# Patient Record
Sex: MALE | Race: OTHER | NOT HISPANIC OR LATINO | URBAN - METROPOLITAN AREA
[De-identification: names, ages, dates, MRNs, and addresses within clinical notes are randomized per-mention and may not be internally consistent; named-entity substitution may affect disease eponyms.]

---

## 2018-04-24 ENCOUNTER — OUTPATIENT (OUTPATIENT)
Dept: OUTPATIENT SERVICES | Facility: HOSPITAL | Age: 48
LOS: 1 days | Discharge: HOME | End: 2018-04-24

## 2018-04-24 DIAGNOSIS — Z00.01 ENCOUNTER FOR GENERAL ADULT MEDICAL EXAMINATION WITH ABNORMAL FINDINGS: ICD-10-CM

## 2019-07-26 ENCOUNTER — EMERGENCY (EMERGENCY)
Facility: HOSPITAL | Age: 49
LOS: 0 days | Discharge: HOME | End: 2019-07-26
Attending: EMERGENCY MEDICINE | Admitting: EMERGENCY MEDICINE
Payer: COMMERCIAL

## 2019-07-26 VITALS
OXYGEN SATURATION: 98 % | HEIGHT: 65 IN | DIASTOLIC BLOOD PRESSURE: 75 MMHG | HEART RATE: 78 BPM | RESPIRATION RATE: 20 BRPM | TEMPERATURE: 98 F | SYSTOLIC BLOOD PRESSURE: 155 MMHG | WEIGHT: 179.9 LBS

## 2019-07-26 DIAGNOSIS — N50.811 RIGHT TESTICULAR PAIN: ICD-10-CM

## 2019-07-26 DIAGNOSIS — R10.9 UNSPECIFIED ABDOMINAL PAIN: ICD-10-CM

## 2019-07-26 DIAGNOSIS — F17.200 NICOTINE DEPENDENCE, UNSPECIFIED, UNCOMPLICATED: ICD-10-CM

## 2019-07-26 DIAGNOSIS — K40.90 UNILATERAL INGUINAL HERNIA, WITHOUT OBSTRUCTION OR GANGRENE, NOT SPECIFIED AS RECURRENT: ICD-10-CM

## 2019-07-26 DIAGNOSIS — R05 COUGH: ICD-10-CM

## 2019-07-26 PROCEDURE — 99053 MED SERV 10PM-8AM 24 HR FAC: CPT

## 2019-07-26 PROCEDURE — 99284 EMERGENCY DEPT VISIT MOD MDM: CPT

## 2019-07-26 PROCEDURE — 76870 US EXAM SCROTUM: CPT | Mod: 26

## 2019-07-26 NOTE — ED PROVIDER NOTE - ATTENDING CONTRIBUTION TO CARE
49yoM previously healthy presents with R groin pain x 2 days, nonradiating, with coughing and standing. States he has noticed a bulge in the R groin area as well when he coughs. He has been coughing for 3-4 days now. Denies abdominal pain, fever, leg pain or swelling, dys/hematuria, vomiting, diarrhea, penile discharge, or any other symptoms. On exam, afebrile, hemodynamically stable, saturating well, NAD, well appearing, head NCAT, EOMI grossly, anicteric, MMM, no JVD, RRR, nml S1/S2, no m/r/g, lungs CTAB, no w/r/r, abd soft, NT, ND, nml BS, no rebound or guarding, AAO, CN's 3-12 grossly intact, GONZALEZ spontaneously, no leg cyanosis or edema, skin warm, well perfused, no rashes or hives. Palpable easily reducible R inguinal hernia with no overlying erythema or tenderness.  (with below resident as chaperone): no testicular pain/swelling/erythema. Nml lie. Character low suspicion for torsion or epididymitis and no e/o this on US. Character and exam of high suspicion for hernia. No e/o incarceration and no vomiting or signs of obstruction. Patient is well appearing, NAD, afebrile, hemodynamically stable. No pain and declines analgesia. Discharged with instructions in further symptomatic care, return precautions, and need for gen surg f/u.

## 2019-07-26 NOTE — ED PROVIDER NOTE - NS ED ROS FT
Eyes:  No visual changes  ENMT:  No sore throat  Cardiac:  No chest pain, SOB or edema.   Respiratory:  +cough   GI:  No nausea, vomiting, diarrhea or abdominal pain.  :  No dysuria, frequency or burning.  MS:  No back pain   Neuro:  No headache   Skin:  No skin rash.   Endocrine: No history of thyroid disease or diabetes.

## 2019-07-26 NOTE — ED PROVIDER NOTE - OBJECTIVE STATEMENT
49y M presenting for RLQ pain x2days. Acute onset, radiation to R testicle. Pt has not appreciated any bulges. No trauma, no fall. Pt has been having congestion and has been coughing all week. Pain worse with cough. No penile discharge, dysuria, or any urinary symptoms. No nausea, vomiting, fevers, or chills. Normal bowel movements and appetite. No hx of hernia.

## 2019-07-26 NOTE — ED PROVIDER NOTE - MDM ORDERS SUBMITTED SELECTION
Informed pt, Dr. Brown is recommending pt go to ER.    Patient verbalizes understanding and is agreement with treatment plan, no further questions at this time.     Imaging Studies

## 2019-07-26 NOTE — ED PROVIDER NOTE - NSFOLLOWUPCLINICS_GEN_ALL_ED_FT
[address] [facility]  Surgery  [address] [place] [address]  Phone: [phone]  Fax:   Follow Up Time:

## 2019-07-26 NOTE — ED PROVIDER NOTE - PHYSICAL EXAMINATION
CONSTITUTIONAL: Well-developed; well-nourished; in no acute distress.   SKIN: warm, dry  HEAD: Normocephalic; atraumatic.  EYES: no conjunctival injection.    ENT: No nasal discharge; airway clear.  CARD: Regular rate and rhythm.   RESP: No wheezes, rales or rhonchi.  ABD: soft. Bulge appreciated in R inguinal region. Tender R testicle.   EXT: Normal ROM.  No clubbing, cyanosis or edema.   NEURO: Alert, oriented, grossly unremarkable  PSYCH: Cooperative, appropriate.

## 2019-07-26 NOTE — ED ADULT NURSE NOTE - NSIMPLEMENTINTERV_GEN_ALL_ED
Implemented All Universal Safety Interventions:  [place] to call system. Call bell, personal items and telephone within reach. Instruct patient to call for assistance. Room bathroom lighting operational. Non-slip footwear when patient is off stretcher. Physically safe environment: no spills, clutter or unnecessary equipment. Stretcher in lowest position, wheels locked, appropriate side rails in place.

## 2021-11-10 ENCOUNTER — OUTPATIENT (OUTPATIENT)
Dept: OUTPATIENT SERVICES | Facility: HOSPITAL | Age: 51
LOS: 1 days | Discharge: HOME | End: 2021-11-10
Payer: MEDICAID

## 2021-11-10 DIAGNOSIS — R10.12 LEFT UPPER QUADRANT PAIN: ICD-10-CM

## 2021-11-10 PROCEDURE — 71046 X-RAY EXAM CHEST 2 VIEWS: CPT | Mod: 26

## 2021-11-10 PROCEDURE — 76700 US EXAM ABDOM COMPLETE: CPT | Mod: 26

## 2024-01-11 ENCOUNTER — EMERGENCY (EMERGENCY)
Facility: HOSPITAL | Age: 54
LOS: 0 days | Discharge: HOME | End: 2024-01-11
Attending: STUDENT IN AN ORGANIZED HEALTH CARE EDUCATION/TRAINING PROGRAM
Payer: COMMERCIAL

## 2024-01-11 VITALS
OXYGEN SATURATION: 99 % | DIASTOLIC BLOOD PRESSURE: 106 MMHG | HEART RATE: 89 BPM | WEIGHT: 179.9 LBS | SYSTOLIC BLOOD PRESSURE: 219 MMHG | RESPIRATION RATE: 18 BRPM | TEMPERATURE: 98 F

## 2024-01-11 VITALS — SYSTOLIC BLOOD PRESSURE: 180 MMHG | DIASTOLIC BLOOD PRESSURE: 94 MMHG | HEART RATE: 102 BPM

## 2024-01-11 DIAGNOSIS — I10 ESSENTIAL (PRIMARY) HYPERTENSION: ICD-10-CM

## 2024-01-11 DIAGNOSIS — R51.9 HEADACHE, UNSPECIFIED: ICD-10-CM

## 2024-01-11 DIAGNOSIS — F17.200 NICOTINE DEPENDENCE, UNSPECIFIED, UNCOMPLICATED: ICD-10-CM

## 2024-01-11 PROCEDURE — 99284 EMERGENCY DEPT VISIT MOD MDM: CPT | Mod: 25

## 2024-01-11 PROCEDURE — G1004: CPT

## 2024-01-11 PROCEDURE — 70450 CT HEAD/BRAIN W/O DYE: CPT | Mod: MF

## 2024-01-11 PROCEDURE — 70450 CT HEAD/BRAIN W/O DYE: CPT | Mod: 26,MF

## 2024-01-11 PROCEDURE — 99284 EMERGENCY DEPT VISIT MOD MDM: CPT

## 2024-01-11 RX ORDER — AMLODIPINE BESYLATE 2.5 MG/1
1 TABLET ORAL
Qty: 30 | Refills: 0
Start: 2024-01-11 | End: 2024-02-09

## 2024-01-11 RX ORDER — ACETAMINOPHEN 500 MG
975 TABLET ORAL ONCE
Refills: 0 | Status: COMPLETED | OUTPATIENT
Start: 2024-01-11 | End: 2024-01-11

## 2024-01-11 RX ORDER — METOCLOPRAMIDE HCL 10 MG
10 TABLET ORAL ONCE
Refills: 0 | Status: COMPLETED | OUTPATIENT
Start: 2024-01-11 | End: 2024-01-11

## 2024-01-11 RX ADMIN — Medication 975 MILLIGRAM(S): at 20:36

## 2024-01-11 RX ADMIN — Medication 10 MILLIGRAM(S): at 20:36

## 2024-01-11 NOTE — ED PROVIDER NOTE - PATIENT PORTAL LINK FT
You can access the FollowMyHealth Patient Portal offered by [facility] by registering at the following website: [email or web address]. By joining [employer]’s FollowMyHealth portal, you will also be able to view your health information using other applications (apps) compatible with our system. You can access the FollowMyHealth Patient Portal offered by [facility] by registering at the following website: [email or web address]. By joining [employer]’s FollowMyHealth portal, you will also be able to view your health information using other applications (apps) compatible with our system. You can access the FollowMyHealth Patient Portal offered by [facility] by registering at the following website: [email or web address]. By joining [employer]’s FollowMyHealth portal, you will also be able to view your health information using other applications (apps) compatible with our system.

## 2024-01-11 NOTE — ED PROVIDER NOTE - CARE PROVIDER_API CALL
Darby Santiago  Internal Medicine  [address] [place] [id number]  Phone: [phone]  Fax: [phone]  Established Patient  Follow Up Time: Routine   Darby Santiago  Internal Medicine  [address] [place] [id number]  Phone: [phone]  Fax: [phone]  Established Patient  Follow Up Time: Routine   Darby Santiago  Internal Medicine  [address] [place] [id number]  Phone: [phone]  Fax: [phone]  Established Patient  Follow Up Time: Routine

## 2024-01-11 NOTE — ED PROVIDER NOTE - NSFOLLOWUPINSTRUCTIONS_ED_ALL_ED_FT
Here’s what you can do to help manage your blood pressure:     I. Check your blood pressure.  - Keep a record of your blood pressure results.    - If you have a blood pressure measure at home that is higher than wait 2 minutes and take it again. If the second reading shows either number at or above the first reading, OR if you have any symptoms shown at the end of this page, seek immediate emergency medical treatment.     II. Taking medicines regularly     III. Lifestyle changes  - Limit your activity until your blood pressure is controlled.  - Cut back on salt.  - Limit canned, dried, packaged, and fast foods.  - Don’t add salt to your food at the table.  - Season foods with herbs instead of salt when you cook.  - Request foods at restaurants with no added salt.  - Maintain a healthy weight. Get help to lose any extra pounds.  - Begin an exercise program. Ask your doctor how to get started. You can benefit from simple activities like walking, gardening, swimming, or dancing.  - Don’t drink more than 1 alcoholic drink a day for women and 2 a day for men.  - Limit drinks that contain caffeine (coffee, black or green tea, cola) to 2 per day.  - Never take stimulants such as amphetamines or cocaine; these drugs can be deadly for someone with hypertension.  - Control your stress. Learn stress-management techniques.     V. Call 911 if you have any of these:  - You have symptoms of a heart attack. These may include  a. Chest pain or pressure, or a strange feeling in the chest  b. Sweating  c. Shortness of breath  d. Nausea or vomiting  e. Pain, pressure, or a strange feeling in the back, neck, jaw  f. Lightheadedness or sudden weakness.  g. Fast or irregular heartbeat.     - You have symptoms of a stroke. These may include:  a. Sudden numbness, tingling, weakness, or loss of movement in your face, arm, or leg, especially on only one side of your body.  b. Sudden vision changes  c. Sudden trouble speaking  d. Sudden confusion or trouble understanding simple statements Here’s what you can do to help manage your blood pressure:     I. Check your blood pressure.  - Keep a record of your blood pressure results.    - If you have a blood pressure measure at home that is higher than 180/110 wait 2 minutes and take it again. If the second reading shows either number at or above the first reading, OR if you have any symptoms shown at the end of this page, seek immediate emergency medical treatment.     II. Taking medicines regularly     III. Lifestyle changes  - Limit your activity until your blood pressure is controlled.  - Cut back on salt.  - Limit canned, dried, packaged, and fast foods.  - Don’t add salt to your food at the table.  - Season foods with herbs instead of salt when you cook.  - Request foods at restaurants with no added salt.  - Maintain a healthy weight. Get help to lose any extra pounds.  - Begin an exercise program. Ask your doctor how to get started. You can benefit from simple activities like walking, gardening, swimming, or dancing.  - Don’t drink more than 1 alcoholic drink a day for women and 2 a day for men.  - Limit drinks that contain caffeine (coffee, black or green tea, cola) to 2 per day.  - Never take stimulants such as amphetamines or cocaine; these drugs can be deadly for someone with hypertension.  - Control your stress. Learn stress-management techniques.     V. Call 911 if you have any of these:  - You have symptoms of a heart attack. These may include  a. Chest pain or pressure, or a strange feeling in the chest  b. Sweating  c. Shortness of breath  d. Nausea or vomiting  e. Pain, pressure, or a strange feeling in the back, neck, jaw  f. Lightheadedness or sudden weakness.  g. Fast or irregular heartbeat.     - You have symptoms of a stroke. These may include:  a. Sudden numbness, tingling, weakness, or loss of movement in your face, arm, or leg, especially on only one side of your body.  b. Sudden vision changes  c. Sudden trouble speaking  d. Sudden confusion or trouble understanding simple statements

## 2024-01-11 NOTE — ED PROVIDER NOTE - PROVIDER TOKENS
PROVIDER:[TOKEN:[[id number]],FOLLOWUP:[Routine],ESTABLISHEDPATIENT:[T]] PROVIDER:[TOKEN:[[id number]],FOLLOWUP:[Routine],ESTABLISHEDPATIENT:[T]] PROVIDER:[TOKEN:[[id number]],FOLLOWUP:[Routine],ESTABLISHEDPATIENT:[T]]

## 2024-01-11 NOTE — ED ADULT NURSE NOTE - NSFALLUNIVINTERV_ED_ALL_ED
Bed/Stretcher in lowest position, wheels locked, appropriate side rails in place/Call bell, personal items and telephone in reach/Instruct patient to call for assistance before getting out of bed/chair/stretcher/Non-slip footwear applied when patient is off stretcher/[place] to call system/Physically safe environment - no spills, clutter or unnecessary equipment/Purposeful proactive rounding/Room/bathroom lighting operational, light cord in reach Bed/Stretcher in lowest position, wheels locked, appropriate side rails in place/Call bell, personal items and telephone in reach/Instruct patient to call for assistance before getting out of bed/chair/stretcher/Non-slip footwear applied when patient is off stretcher/[place] to call system/Physically safe environment - no spills, clutter or unnecessary equipment/Purposeful proactive rounding/Room/bathroom lighting operational, light cord in reach

## 2024-01-11 NOTE — ED PROVIDER NOTE - OBJECTIVE STATEMENT
53M w/ h/o HTN presenting from PCP office for severe HTN w/ headache. Pt has known h/o HTN (known to have readings w/ SBP>200), but not on any meds. Recently evaluated for HA while on vacation a few months ago and once again found to have elevated BP. Started on HTN med (pt unsure of name) and instructed to f/u w/ PCP. Saw PCP earlier today and found to have elevated BP in office (SBP>200) and instructed to come to ED. Patient also reports right sided frontal headache. Pain rated 5/10 currently. No reported blurry vision, photophobia, phonophobia, paresthesias, weakness, or LOC reported.

## 2024-01-11 NOTE — ED PROVIDER NOTE - CLINICAL SUMMARY MEDICAL DECISION MAKING FREE TEXT BOX
53-year-old male presenting today for evaluation of high blood pressure.  Patient denies any other symptomatology.  Headache treated.  Patient was discharged to close follow-up with PMD.  Return precautions explained to patient.  Patient counseled on being compliant with his medications.